# Patient Record
Sex: FEMALE | Race: WHITE | NOT HISPANIC OR LATINO | Employment: FULL TIME | URBAN - METROPOLITAN AREA
[De-identification: names, ages, dates, MRNs, and addresses within clinical notes are randomized per-mention and may not be internally consistent; named-entity substitution may affect disease eponyms.]

---

## 2017-05-05 ENCOUNTER — OFFICE VISIT (OUTPATIENT)
Dept: PODIATRY | Facility: CLINIC | Age: 47
End: 2017-05-05
Payer: COMMERCIAL

## 2017-05-05 VITALS
DIASTOLIC BLOOD PRESSURE: 72 MMHG | HEIGHT: 66 IN | BODY MASS INDEX: 23.3 KG/M2 | SYSTOLIC BLOOD PRESSURE: 114 MMHG | WEIGHT: 145 LBS

## 2017-05-05 DIAGNOSIS — Q66.70 PES CAVUS, CONGENITAL: ICD-10-CM

## 2017-05-05 DIAGNOSIS — M72.2 PLANTAR FASCIITIS OF RIGHT FOOT: ICD-10-CM

## 2017-05-05 DIAGNOSIS — M79.671 RIGHT FOOT PAIN: Primary | ICD-10-CM

## 2017-05-05 PROCEDURE — 99203 OFFICE O/P NEW LOW 30 MIN: CPT | Performed by: PODIATRIST

## 2017-05-05 NOTE — PROGRESS NOTES
"PATIENT HISTORY:  Arielle Domínguez is a 47 year old female who presents to clinic for pain right heel. Has seen [facility] and [facility] who have said \"it was and was not\" plantar fasciitis. She has tried inserts, massage, and did have a cortisone injection. It has been going on for over a year. Denies injury. pain is 5/10. Is in morning or when he gets up from sitting. Would like to know what is causing pain and what can be done for it.     Review of Systems:  Patient denies fever, chills, rash, wound,  numbness, weakness, heart burn, blood in stool, chest pain with activity, calf pain when walking, shortness of breath with activity, chronic cough, easy bleeding/bruising, swelling of ankles, excessive thirst, fatigue, depression, anxiety.  Patient admits to limping, stiffness.     PAST MEDICAL HISTORY: History reviewed. No pertinent past medical history.     PAST SURGICAL HISTORY:   Past Surgical History:   Procedure Laterality Date     GYN SURGERY  2012    polyps        MEDICATIONS: No current outpatient prescriptions on file.     ALLERGIES:  No Known Allergies     SOCIAL HISTORY:   Social History     Social History     Marital status: Single     Spouse name: N/A     Number of children: N/A     Years of education: N/A     Occupational History     Not on file.     Social History Main Topics     Smoking status: Never Smoker     Smokeless tobacco: Never Used     Alcohol use Yes      Comment: 1-2/mo     Drug use: No     Sexual activity: No     Other Topics Concern     Parent/Sibling W/ Cabg, Mi Or Angioplasty Before 65f 55m? No     Social History Narrative        FAMILY HISTORY:   Family History   Problem Relation Age of Onset     Hypertension Mother         EXAM:Vitals: /72  Ht 5' 5.5\" (1.664 m)  Wt 145 lb (65.8 kg)  BMI 23.76 kg/m2    General appearance: Patient is alert and fully cooperative with history & exam.  No sign of distress is noted during the visit.     Psychiatric: Affect is pleasant & appropriate.  Patient " appears motivated to improve health.     Respiratory: Breathing is regular & unlabored while sitting.     HEENT: Hearing is intact to spoken word.  Speech is clear.  No gross evidence of visual impairment that would impact ambulation.     Dermatologic: Skin is intact to both lower extremities without significant lesions, rash or abrasion.  No paronychia or evidence of soft tissue infection is noted.     Vascular: DP & PT pulses are intact & regular bilaterally.  No significant edema or varicosities noted.  CFT and skin temperature is normal to both lower extremities.     Neurologic: Lower extremity sensation is intact to light touch.  No evidence of weakness or contracture in the lower extremities.  No evidence of neuropathy.     Musculoskeletal: Patient is ambulatory without assistive device or brace.  Increase arch height. Pain on palpation of right plantar heel.     Radiographs: from [facility]. Increase calcaneal inclination angle. Plantar heel spur    MRI: from [facility]: plantar fasciitis, moderate.      ASSESSMENT:    Right foot pain  Plantar fasciitis of right foot  Pes cavus, congenital     PLAN:  Reviewed patient's chart in [facility]. The potential causes and nature of plantar fasciitis were discussed with the patient.  We reviewed the natural history/prognosis of the condition and risks if left untreated.  These include chronic pain, other sites of pain due to gait changes, and potential plantar fascial rupture.      We discussed possible causes of the condition as it relates to the patients specific situation.      Conservative treatment options were reviewed:  appropriate shoes, avoidance of barefoot walking, inserts/orthoses, stretching, ice, massage, immobilization and NSAIDs.     We also reviewed the options of injection therapy and surgery.  However, it was made clear that surgery is only considered when conservative therapy fails.  The risks and benefits of injection therapy, and surgery were discussed.     After  thorough discussion and answering all questions, the patient elected to try PT, anti inflammatory gel orthotics, and will get some blood work. Family history of rheumatoid arthritis. Will call with results. .        Betsy Farfan DPM, Podiatry/Foot and Ankle Surgery

## 2017-05-05 NOTE — NURSING NOTE
"Chief Complaint   Patient presents with     Foot Pain     right foot problems x1year Pain on the heel of foot on the medial side stiffness on the top of foot.        Initial /72  Ht 5' 5.5\" (1.664 m)  Wt 145 lb (65.8 kg)  BMI 23.76 kg/m2 Estimated body mass index is 23.76 kg/(m^2) as calculated from the following:    Height as of this encounter: 5' 5.5\" (1.664 m).    Weight as of this encounter: 145 lb (65.8 kg).  Medication Reconciliation: complete   Earl Portillo MA      "

## 2017-05-05 NOTE — LETTER
"  5/5/2017       RE: Arielle Domínguez  [address] [place] [address]           Dear Colleague,    Thank you for referring your patient, Arielle Domínguez, to the [facility]. Please see a copy of my visit note below.    PATIENT HISTORY:  Arielle Domínguez is a 47 year old female who presents to clinic for pain right heel. Has seen [facility] and [facility] who have said \"it was and was not\" plantar fasciitis. She has tried inserts, massage, and did have a cortisone injection. It has been going on for over a year. Denies injury. pain is 5/10. Is in morning or when he gets up from sitting. Would like to know what is causing pain and what can be done for it.     Review of Systems:  Patient denies fever, chills, rash, wound,  numbness, weakness, heart burn, blood in stool, chest pain with activity, calf pain when walking, shortness of breath with activity, chronic cough, easy bleeding/bruising, swelling of ankles, excessive thirst, fatigue, depression, anxiety.  Patient admits to limping, stiffness.     PAST MEDICAL HISTORY: History reviewed. No pertinent past medical history.     PAST SURGICAL HISTORY:   Past Surgical History:   Procedure Laterality Date     GYN SURGERY  2012    polyps        MEDICATIONS: No current outpatient prescriptions on file.     ALLERGIES:  No Known Allergies     SOCIAL HISTORY:   Social History     Social History     Marital status: Single     Spouse name: N/A     Number of children: N/A     Years of education: N/A     Occupational History     Not on file.     Social History Main Topics     Smoking status: Never Smoker     Smokeless tobacco: Never Used     Alcohol use Yes      Comment: 1-2/mo     Drug use: No     Sexual activity: No     Other Topics Concern     Parent/Sibling W/ Cabg, Mi Or Angioplasty Before 65f 55m? No     Social History Narrative        FAMILY HISTORY:   Family History   Problem Relation Age of Onset     Hypertension Mother         EXAM:Vitals: /72  Ht 5' 5.5\" (1.664 " m)  Wt 145 lb (65.8 kg)  BMI 23.76 kg/m2    General appearance: Patient is alert and fully cooperative with history & exam.  No sign of distress is noted during the visit.     Psychiatric: Affect is pleasant & appropriate.  Patient appears motivated to improve health.     Respiratory: Breathing is regular & unlabored while sitting.     HEENT: Hearing is intact to spoken word.  Speech is clear.  No gross evidence of visual impairment that would impact ambulation.     Dermatologic: Skin is intact to both lower extremities without significant lesions, rash or abrasion.  No paronychia or evidence of soft tissue infection is noted.     Vascular: DP & PT pulses are intact & regular bilaterally.  No significant edema or varicosities noted.  CFT and skin temperature is normal to both lower extremities.     Neurologic: Lower extremity sensation is intact to light touch.  No evidence of weakness or contracture in the lower extremities.  No evidence of neuropathy.     Musculoskeletal: Patient is ambulatory without assistive device or brace.  Increase arch height. Pain on palpation of right plantar heel.     Radiographs: from [facility]. Increase calcaneal inclination angle. Plantar heel spur    MRI: from [facility]: plantar fasciitis, moderate.      ASSESSMENT:    Right foot pain  Plantar fasciitis of right foot  Pes cavus, congenital     PLAN:  Reviewed patient's chart in [facility]. The potential causes and nature of plantar fasciitis were discussed with the patient.  We reviewed the natural history/prognosis of the condition and risks if left untreated.  These include chronic pain, other sites of pain due to gait changes, and potential plantar fascial rupture.      We discussed possible causes of the condition as it relates to the patients specific situation.      Conservative treatment options were reviewed:  appropriate shoes, avoidance of barefoot walking, inserts/orthoses, stretching, ice, massage, immobilization and NSAIDs.     We also  reviewed the options of injection therapy and surgery.  However, it was made clear that surgery is only considered when conservative therapy fails.  The risks and benefits of injection therapy, and surgery were discussed.     After thorough discussion and answering all questions, the patient elected to try PT, anti inflammatory gel orthotics, and will get some blood work. Family history of rheumatoid arthritis. Will call with results. .        Betsy Farfan DPM, Podiatry/Foot and Ankle Surgery          Again, thank you for allowing me to participate in the care of your patient.        Sincerely,              Betsy Farfan DPM, Podiatry/Foot and Ankle Surgery

## 2017-05-05 NOTE — PATIENT INSTRUCTIONS
DR. KEARNS'S CLINIC SCHEDULE     [facility]  [address] [place] [address]  P: [phone]  F: [phone] [facility]  [address] [place] [address]  P: [phone]  F: [phone] [facility]  [address] [place] [address]  P: [phone]  F: [phone]   FRIDAY AM FRIDAY PM SURGERY   [facility]     [facility]  [address]  [place] [address]  P: [phone] [facility]  [address]  [place] [address]  P: [phone]  F: [phone] Surgery [phone]   Appointment [phone] General After Hours:  [phone] Patient [phone]             Body Mass Index (BMI)  Many things can cause foot and ankle problems. Foot structure, activity level, foot mechanics and injuries are common causes of pain.    One very important issue that often goes unmentioned, is body weight.  Extra weight can cause increased stress on muscles, ligaments, bones and tendons.  Sometimes just a few extra pounds is all it takes to put one over her/his threshold.   Without reducing that stress, it can be difficult to alleviate pain.      Some people are uncomfortable addressing this issue, but we feel it is important for you to think about it.  As Foot &  Ankle specialists, our job is addressing the lower extremity problem and possible causes.     Regarding extra body weight, we encourage patients to discuss diet and weight management plans with their primary care doctors.  It is this team approach that gives you the best opportunity for pain relief and getting you back on your feet.        PLANTAR FASCIITIS  Plantar fasciitis is often referred to as heel spurs or heel pain. Plantar fasciitis is a very common problem that affects people of all foot shapes, age, weight and activity level. Pain may be in the arch or on the weight-bearing  surface of the heel. The pain may come on without injury or identifiable cause. Pain is generally present when first getting out of bed in the morning or up from a seated break.     CAUSES  The plantar fascia is a dense fibrous band of tissue that stretches across the bottom surface of the foot. The fascia helps support the foot muscles and arch. Plantar fasciitis is thought to be caused by mechanical strain or overload. Frequent walking without shoes or wearing unsupportive shoes is thought to cause structural overload and ultimately inflammation of the plantar fascia. Some people have heel spurs that can be seen on x-ray. The heel spur is actually a minor component of plantar fascitis and is largely ignored.       SELF TREATMENT   The easiest solution is to stop walking around your home without shoes. Plantar fasciitis is largely a shoe problem. Shoes are either not being worn often enough or your current shoes are inadequate for your weight, foot structure or activity level. The majority of shoes on the market today are not sufficient to resist development of plantar fasciitis or to promote healing. Assume that your current shoes are inadequate and will need to be replaced. Even high quality shoes wear out with 6 months to one year of frequent use. Weight loss is another option. Losing ten pounds in the next two months may be enough to resolve the problem. Ice applied to the area of pain two to three times per day for ten minutes each session can be very helpful. This should continue until the problem resolves. Achilles tendon stretching is essential. Stretch multiple times daily to promote healing and to prevent recurrence in the future.     MEDICAL TREATMENT  Medical treatments often include custom arch supports, cortisone injections, physical therapy, splints to be worn in bed, prescription medications and surgery. The home treatments listed above will be necessary regardless of these advanced medical  treatments. Surgery is rarely needed but is very helpful in selected cases.     PROGNOSIS  Plantar fasciitis can last from one day to a lifetime. Some people get intermittent fascitis that is very short-lived. Others suffer daily for years. Excessive body weight, frequent bare foot walking, long hours on the feet, inadequate shoes, predisposing foot structures and excessive activity such as running are all potential issues that lead to chronic and/or recurring plantar fascitis. Having plantar fasciitis means that you are forever prone to this problem and will require modification of some of the above factors. Most people seek treatment within one to four months. Healing usually requires a similar one to four month time frame. Healing time is relative to the amount of effort spent treating the problem.   Plantar fasciitis is highly recurrent. Risk factors often continue, including return to bare foot walking, inadequate shoes, excessive body weight, excessive activities, etc. Your life style and foot structure may predispose you to recurrent plantar fasciitis. A daily prevention regimen can be very helpful. Ongoing use of shoe inserts, careful attention to appropriate shoes, daily Achilles stretching, etc. may prevent recurrence. Prompt attention at the earliest warning signs of heel pain can resolve the problem in as short as a few days.     EXERCISES  Stair Exercise: Step on the stairs with the ball of your foot and hold your position for at least 15 seconds, then slowly step down with the heels of your foot. You can do this daily and as often as you want.   Picking the Towel: Sit comfortably and then pick the towel up with your toes. You can use any object other than a towel as long as the material can be soft and you can pick it up with your toes.  Rolling the Bottle: Use a small ball or frozen water bottle and then roll it around with your foot.   Flex the Toes: Sit comfortably and then flex your toes by  pointing it towards the floor or towards your body. This will relax and flex your foot and exercise your plantar fascia, the calf, and the Achilles tendon. The inability of the foot to stretch often causes the bunching up of the plantar fascia area leading to the pain.  Calf/Achilles Stretching: Lay on you back and raise one foot, then point your toes towards the floor. See photo below:               Hold each stretch for 10 seconds. Stretch 10 times per set, three sets per day. Morning, afternoon and evening. If your heel pain is very severe in the morning, consider doing the first set of stretches before you get out of bed.      OVER THE COUNTER INSERT RECOMMENDATIONS  SuperFeet   Sofsole Fit Spenco   Power Step   Walk-Fit Arch Cradles     Most of these can be found at your local [employer], sporting [employer] stores, or online.  **A good high quality over the counter insert should cost around $40-$50      [facility] LOCATIONS  [address]  [phone]   [address]  [phone] [place]  [address] [place] [address]  [phone]   [place]  [address]  [phone]   [place]  [address]  [phone] [place] [address]  [phone]   [place]  [address]  [phone]   [place]  [address]  [phone] [place]  [address]  [phone]

## 2017-05-05 NOTE — MR AVS SNAPSHOT
After Visit Summary   2017    Arielle Domínguez    MRN: [id number]           Patient Information     Date Of Birth          1970        Visit Information        Provider Department      2017 4:15 PM Betsy Farfan DPM, Podiatry/Foot [facility]le [facility]        Today's Diagnoses     Right foot pain    -  1    Plantar fasciitis of right foot        Pes cavus, congenital          Care Instructions    DR. FARFAN'S CLINIC SCHEDULE     [facility]  [address] [place] [address]  P: [phone]  F: [phone] [facility]  [address] [place] [address]  P: [phone]  F: [phone] [facility]  [address] [place] [address]  P: [phone]  F: [phone]   FRIDAY AM FRIDAY PM SURGERY   [facility]     [facility] [place]  [address]  [place] [address]  P: [phone] [facility] [place]  [address]  [place] [address]  P: [phone]  F: [phone] Surgery [phone]   Appointment [phone] General After Hours:  [phone] Patient [phone]             Body Mass Index (BMI)  Many things can cause foot and ankle problems. Foot structure, activity level, foot mechanics and injuries are common causes of pain.    One very important issue that often goes unmentioned, is body weight.  Extra weight can cause increased stress on muscles, ligaments, bones and tendons.  Sometimes just a few extra pounds is all it takes to put one over her/his threshold.   Without reducing that stress, it can be difficult to alleviate pain.      Some people are uncomfortable addressing this issue, but we feel it is important for you to think about it.  As Foot &  Ankle specialists, our job is addressing the lower extremity problem and possible causes.     Regarding extra body weight, we encourage patients to  discuss diet and weight management plans with their primary care doctors.  It is this team approach that gives you the best opportunity for pain relief and getting you back on your feet.        PLANTAR FASCIITIS  Plantar fasciitis is often referred to as heel spurs or heel pain. Plantar fasciitis is a very common problem that affects people of all foot shapes, age, weight and activity level. Pain may be in the arch or on the weight-bearing surface of the heel. The pain may come on without injury or identifiable cause. Pain is generally present when first getting out of bed in the morning or up from a seated break.     CAUSES  The plantar fascia is a dense fibrous band of tissue that stretches across the bottom surface of the foot. The fascia helps support the foot muscles and arch. Plantar fasciitis is thought to be caused by mechanical strain or overload. Frequent walking without shoes or wearing unsupportive shoes is thought to cause structural overload and ultimately inflammation of the plantar fascia. Some people have heel spurs that can be seen on x-ray. The heel spur is actually a minor component of plantar fascitis and is largely ignored.       SELF TREATMENT   The easiest solution is to stop walking around your home without shoes. Plantar fasciitis is largely a shoe problem. Shoes are either not being worn often enough or your current shoes are inadequate for your weight, foot structure or activity level. The majority of shoes on the market today are not sufficient to resist development of plantar fasciitis or to promote healing. Assume that your current shoes are inadequate and will need to be replaced. Even high quality shoes wear out with 6 months to one year of frequent use. Weight loss is another option. Losing ten pounds in the next two months may be enough to resolve the problem. Ice applied to the area of pain two to three times per day for ten minutes each session can be very helpful. This should  continue until the problem resolves. Achilles tendon stretching is essential. Stretch multiple times daily to promote healing and to prevent recurrence in the future.     MEDICAL TREATMENT  Medical treatments often include custom arch supports, cortisone injections, physical therapy, splints to be worn in bed, prescription medications and surgery. The home treatments listed above will be necessary regardless of these advanced medical treatments. Surgery is rarely needed but is very helpful in selected cases.     PROGNOSIS  Plantar fasciitis can last from one day to a lifetime. Some people get intermittent fascitis that is very short-lived. Others suffer daily for years. Excessive body weight, frequent bare foot walking, long hours on the feet, inadequate shoes, predisposing foot structures and excessive activity such as running are all potential issues that lead to chronic and/or recurring plantar fascitis. Having plantar fasciitis means that you are forever prone to this problem and will require modification of some of the above factors. Most people seek treatment within one to four months. Healing usually requires a similar one to four month time frame. Healing time is relative to the amount of effort spent treating the problem.   Plantar fasciitis is highly recurrent. Risk factors often continue, including return to bare foot walking, inadequate shoes, excessive body weight, excessive activities, etc. Your life style and foot structure may predispose you to recurrent plantar fasciitis. A daily prevention regimen can be very helpful. Ongoing use of shoe inserts, careful attention to appropriate shoes, daily Achilles stretching, etc. may prevent recurrence. Prompt attention at the earliest warning signs of heel pain can resolve the problem in as short as a few days.     EXERCISES  Stair Exercise: Step on the stairs with the ball of your foot and hold your position for at least 15 seconds, then slowly step down with  the heels of your foot. You can do this daily and as often as you want.   Picking the Towel: Sit comfortably and then pick the towel up with your toes. You can use any object other than a towel as long as the material can be soft and you can pick it up with your toes.  Rolling the Bottle: Use a small ball or frozen water bottle and then roll it around with your foot.   Flex the Toes: Sit comfortably and then flex your toes by pointing it towards the floor or towards your body. This will relax and flex your foot and exercise your plantar fascia, the calf, and the Achilles tendon. The inability of the foot to stretch often causes the bunching up of the plantar fascia area leading to the pain.  Calf/Achilles Stretching: Lay on you back and raise one foot, then point your toes towards the floor. See photo below:               Hold each stretch for 10 seconds. Stretch 10 times per set, three sets per day. Morning, afternoon and evening. If your heel pain is very severe in the morning, consider doing the first set of stretches before you get out of bed.      OVER THE COUNTER INSERT RECOMMENDATIONS  SuperFeet   Sofsole Fit Spenco   Power Step   Walk-Fit Arch Cradles     Most of these can be found at your local [email or web address], sporting goods stores, or online.  **A good high quality over the counter insert should cost around $40-$50      [employer] [address]  [phone]   [address]  [phone] [place]  [address] [place] [address]  [phone]   [place]  [address] [place] [address] Street [address]  [phone]   [place]  [address]  [phone] [place]  [address] [place] [address] [place]  [phone]   [place]  [address]  [phone]   [place]  [address]  [phone] [place]  [address]  [phone]                 Follow-ups after your visit        Additional Services     [facility] PT, HAND, AND CHIROPRACTIC REFERRAL       **This order will  print in the [facility] Scheduling Office**    Physical Therapy, Hand Therapy and Chiropractic Care are available through:    *[facility]  *[facility]  *[facility]    Call one number to schedule at any of the above locations: [phone].    Your provider has referred you to: Physical Therapy at [facility] or [facility]    Indication/Reason for Referral: right heel pain    Onset of Illness: year    Therapy Orders: Evaluate and Treat  Special Programs: None  Special Request: Exercise: Conditioning, Home Exercise Program, Posture/Body Mechanics and Stretching/Flexibility  Modalities: As Indicated:  and Ultrasound    Dyan Zaidi      Additional Comments for the Therapist or Chiropractor:     Please be aware that coverage of these services is subject to the terms and limitations of your health insurance plan.  Call member services at your health plan with any benefit or coverage questions.      Please bring the following to your appointment:    *Your personal calendar for scheduling future appointments  *Comfortable clothing                  Future tests that were ordered for you today     Open Future Orders        Priority Expected Expires Ordered    Rheumatoid factor Routine  5/5/2018 5/5/2017    HLA-B27 Typing Routine  7/31/2017 5/5/2017            Who to contact     If you have questions or need follow up information about today's clinic visit or your schedule please contact [facility] directly at [phone].  Normal or non-critical lab and imaging results will be communicated to you by MyChart, letter or phone within 4 business days after the clinic has received the results. If you do not hear from us within 7 days, please contact the clinic through MyChart or phone. If you have a critical or abnormal lab result, we will notify you by phone as soon as possible.  Submit refill requests through [employer] or call your pharmacy and they will forward the refill  "request to us. Please allow 3 business days for your refill to be completed.          Additional Information About Your Visit        [employer] Information     [email or web address] lets you send messages to your doctor, view your test results, renew your prescriptions, schedule appointments and more. To sign up, go to [email or web address] . Click on \"Log in\" on the left side of the screen, which will take you to the Welcome page. Then click on \"Sign up Now\" on the right side of the page.     You will be asked to enter the access code listed below, as well as some personal information. Please follow the directions to create your username and password.     Your access code is: [email or web address]  Expires: 8/3/2017  4:51 PM     Your access code will  in 90 days. If you need help or a new code, please call your [facility] clinic or [phone].        Care EveryWhere ID     This is your Care EveryWhere ID. This could be used by other organizations to access your [facility] medical records  [id number]        Your Vitals Were     Height BMI (Body Mass Index)                5' 5.5\" (1.664 m) 23.76 kg/m2           Blood Pressure from Last 3 Encounters:   17 114/72   13 110/60    Weight from Last 3 Encounters:   17 145 lb (65.8 kg)   13 145 lb 1.6 oz (65.8 kg)              We Performed the Following     [facility] PT, HAND, AND CHIROPRACTIC REFERRAL          Today's Medication Changes          These changes are accurate as of: 17  4:51 PM.  If you have any questions, ask your nurse or doctor.               Start taking these medicines.        Dose/Directions    diclofenac 1 % Gel topical gel   Commonly known as:  VOLTAREN   Used for:  Right foot pain, Plantar fasciitis of right foot, Pes cavus, congenital   Started by:  Betsy Farfan DPM, Podiatry/Foot and Ankle Surgery        Apply 4 grams to knees or 2 grams to hands four times daily using enclosed dosing card.   Quantity:  100 g   Refills:  1            Where to " get your medicines      These medications were sent to [facility]pharmacy #[id number] - [place], [place] - [address]  [address], [facility] [address]     Phone:  [phone]     diclofenac 1 % Gel topical gel                Primary Care Provider    None Specified       No primary provider on file.        Thank you!     Thank you for choosing [facility]  for your care. Our goal is always to provide you with excellent care. Hearing back from our patients is one way we can continue to improve our services. Please take a few minutes to complete the written survey that you may receive in the mail after your visit with us. Thank you!             Your Updated Medication List - Protect others around you: Learn how to safely use, store and throw away your medicines at www.disposemymeds.org.          This list is accurate as of: 5/5/17  4:51 PM.  Always use your most recent med list.                   Brand Name Dispense Instructions for use    diclofenac 1 % Gel topical gel    VOLTAREN    100 g    Apply 4 grams to knees or 2 grams to hands four times daily using enclosed dosing card.

## 2017-05-12 ENCOUNTER — THERAPY VISIT (OUTPATIENT)
Dept: PHYSICAL THERAPY | Facility: CLINIC | Age: 47
End: 2017-05-12
Payer: COMMERCIAL

## 2017-05-12 DIAGNOSIS — M72.2 PLANTAR FASCIITIS OF RIGHT FOOT: Primary | ICD-10-CM

## 2017-05-12 PROCEDURE — 97161 PT EVAL LOW COMPLEX 20 MIN: CPT | Mod: GP | Performed by: PHYSICAL THERAPIST

## 2017-05-12 PROCEDURE — 97110 THERAPEUTIC EXERCISES: CPT | Mod: GP | Performed by: PHYSICAL THERAPIST

## 2017-05-12 NOTE — MR AVS SNAPSHOT
"              After Visit Summary   5/12/2017    Arielle Domínguez    MRN: [id number]           Patient Information     Date Of Birth          1970        Visit Information        Provider Department      5/12/2017 4:40 PM Cruzito Ma, PT [facility]        Today's Diagnoses     Plantar fasciitis of right foot    -  1       Follow-ups after your visit        Your next 10 appointments already scheduled     May 20, 2017 11:20 AM CDT   [facility] Extremity with Cruzito Ma PT   [facility] ([facility] [place]  )    [address]  [address]  [facility] [address]   [phone]            May 26, 2017  4:40 PM CDT   [facility] Extremity with Cruzito Ma, PT   [facility] ([facility] [place]  )    [address]  [facility] [address]   [phone]              Who to contact     If you have questions or need follow up information about today's clinic visit or your schedule please contact [facility] directly at [phone].  Normal or non-critical lab and imaging results will be communicated to you by [employer], letter or phone within 4 business days after the clinic has received the results. If you do not hear from us within 7 days, please contact the clinic through [employer] or phone. If you have a critical or abnormal lab result, we will notify you by phone as soon as possible.  Submit refill requests through [employer] or call your pharmacy and they will forward the refill request to us. Please allow 3 business days for your refill to be completed.          Additional Information About Your Visit        [employer] Information     Epiphyte lets you send messages to your doctor, view your test results, renew your prescriptions, schedule appointments and more. To sign up, go to [email or web address] . Click on \"Log in\" on the left side of the screen, which will take you to the Welcome page. Then click on \"Sign up Now\" on the right side of the page.     You will be asked to enter the " access code listed below, as well as some personal information. Please follow the directions to create your username and password.     Your access code is: [email or web address]  Expires: 8/3/2017  4:51 PM     Your access code will  in 90 days. If you need help or a new code, please call your [facility] clinic or [phone].        Care EveryWhere ID     This is your Care EveryWhere ID. This could be used by other organizations to access your [facility] medical records  [id number]         Blood Pressure from Last 3 Encounters:   17 114/72   13 110/60    Weight from Last 3 Encounters:   17 65.8 kg (145 lb)   13 65.8 kg (145 lb 1.6 oz)              We Performed the Following     [facility] Inital Eval Report     PT Eval, Low Complexity (00932)     Therapeutic Exercises        Primary Care Provider    None Specified       No primary provider on file.        Thank you!     Thank you for choosing [facility]  for your care. Our goal is always to provide you with excellent care. Hearing back from our patients is one way we can continue to improve our services. Please take a few minutes to complete the written survey that you may receive in the mail after your visit with us. Thank you!             Your Updated Medication List - Protect others around you: Learn how to safely use, store and throw away your medicines at www.disposemymeds.org.          This list is accurate as of: 17 11:59 PM.  Always use your most recent med list.                   Brand Name Dispense Instructions for use    diclofenac 1 % Gel topical gel    VOLTAREN    100 g    Apply 4 grams to knees or 2 grams to hands four times daily using enclosed dosing card.

## 2017-05-12 NOTE — PROGRESS NOTES
Subjective:    Patient is a 47 year old female presenting with rehab right ankle/foot hpi.   Arielle Domínguez is a 47 year old female with a right foot condition.  Condition occurred with:  Repetition/overuse.  Condition occurred: during recreation/sport.  This is a chronic condition  Onset of right heel pain 3/16 secondary to exercise. Pt has had constant pain since despite physical therapy and deep tissue massage. Pt referred by MD for physical therapy on 5-5-17.    Site of Pain: heel.  Radiates to:  Lower leg and ankle.  Pain is described as aching and is constant and reported as 7/10.  Associated symptoms:  Loss of motion/stiffness and loss of strength. Pain is worse in the A.M..  Symptoms are exacerbated by weight bearing, standing, activity, walking and running and relieved by rest.  Since onset symptoms are unchanged.  Special tests:  MRI (pt reports plantar fascitis).  Previous treatment includes physical therapy and chiropractic.  There was moderate improvement following previous treatment.  General health as reported by patient is excellent.  Pertinent medical history includes:  None.  Medical allergies: no.  Other surgeries include:  Other (polyps removed lining of the uterus).  Current medications:  None as reported by patient.  Current occupation is teacher.  Patient is working in normal job without restrictions.  Primary job tasks include:  Prolonged standing.        Red flags:  None as reported by patient.                        Objective:          Flexibility/Screens:       Lower Extremity:      Decreased right lower extremity flexibility:  Gastroc and Soleus          Ankle/Foot Evaluation  ROM:    AROM:    Dorsiflexion: Left:    Right:   5  Plantarflexion: Left:     Right:  60          PROM:    Dorsiflexion: Left:        Right:   8   Plantarflexion: Left:        Right:  62              Strength:    Dorsiflexion:  Right: 4/5   Pain:  Plantarflexion: Right: 5/5  Pain:  Inversion:Right: 5/5   Pain:  Eversion:Right: 4+/5  Pain:              Strength wnl ankle: weak pelvic stabilizers.  LIGAMENT TESTING: normal                PALPATION: Palpation of ankle: point tenderness right heel.        FUNCTIONAL TESTS: Functional test ankle: fair balance.                                                              General     ROS    Assessment/Plan:      Patient is a 47 year old female with right side ankle complaints.    Patient has the following significant findings with corresponding treatment plan.                Diagnosis 1:  Right heel pain  Pain -  hot/cold therapy, US, manual therapy, self management, education and home program  Decreased ROM/flexibility - manual therapy, therapeutic exercise, therapeutic activity and home program  Decreased strength - therapeutic exercise, therapeutic activities and home program    Therapy Evaluation Codes:   1) History comprised of:   Personal factors that impact the plan of care:      Time since onset of symptoms.    Comorbidity factors that impact the plan of care are:      None.     Medications impacting care: None.  2) Examination of Body Systems comprised of:   Body structures and functions that impact the plan of care:      Ankle, Hip and Knee.   Activity limitations that impact the plan of care are:      Running, Sports, Stairs, Standing and Walking.  3) Clinical presentation characteristics are:   Stable/Uncomplicated.  4) Decision-Making    Low complexity using standardized patient assessment instrument and/or measureable assessment of functional outcome.  Cumulative Therapy Evaluation is: Low complexity.    Previous and current functional limitations:  (See Goal Flow Sheet for this information)    Short term and Long term goals: (See Goal Flow Sheet for this information)     Communication ability:  Patient appears to be able to clearly communicate and understand verbal and written communication and follow directions correctly.  Treatment Explanation - The  following has been discussed with the patient:   RX ordered/plan of care  Anticipated outcomes  Possible risks and side effects  This patient would benefit from PT intervention to resume normal activities.   Rehab potential is good.    Frequency:  1 X week, once daily  Duration:  for 6 weeks  Discharge Plan:  Achieve all LTG.  Independent in home treatment program.  Reach maximal therapeutic benefit.    Please refer to the daily flowsheet for treatment today, total treatment time and time spent performing 1:1 timed codes.

## 2017-05-13 ENCOUNTER — RESULTS ONLY (OUTPATIENT)
Dept: OTHER | Facility: CLINIC | Age: 47
End: 2017-05-13

## 2017-05-13 DIAGNOSIS — M79.671 RIGHT FOOT PAIN: ICD-10-CM

## 2017-05-13 DIAGNOSIS — M72.2 PLANTAR FASCIITIS OF RIGHT FOOT: ICD-10-CM

## 2017-05-13 DIAGNOSIS — Q66.70 PES CAVUS, CONGENITAL: ICD-10-CM

## 2017-05-13 PROCEDURE — 81374 HLA I TYPING 1 ANTIGEN LR: CPT | Performed by: PODIATRIST

## 2017-05-13 PROCEDURE — 86431 RHEUMATOID FACTOR QUANT: CPT | Performed by: PODIATRIST

## 2017-05-13 PROCEDURE — 36415 COLL VENOUS BLD VENIPUNCTURE: CPT | Performed by: PODIATRIST

## 2017-05-15 LAB
HLA-B27 QL NAA+PROBE: NORMAL
RHEUMATOID FACT SER NEPH-ACNC: <20 IU/ML (ref 0–20)

## 2017-05-17 LAB
B LOCUS: NORMAL
B27TEST METHOD: NORMAL

## 2017-05-20 ENCOUNTER — THERAPY VISIT (OUTPATIENT)
Dept: PHYSICAL THERAPY | Facility: CLINIC | Age: 47
End: 2017-05-20
Payer: COMMERCIAL

## 2017-05-20 DIAGNOSIS — M72.2 PLANTAR FASCIITIS OF RIGHT FOOT: ICD-10-CM

## 2017-05-20 PROCEDURE — 97530 THERAPEUTIC ACTIVITIES: CPT | Mod: GP | Performed by: PHYSICAL THERAPIST

## 2017-05-20 PROCEDURE — 97110 THERAPEUTIC EXERCISES: CPT | Mod: GP | Performed by: PHYSICAL THERAPIST

## 2017-05-20 NOTE — PROGRESS NOTES
Subjective:    HPI                    Objective:    System    Physical Exam    General     ROS    Assessment/Plan:      SUBJECTIVE  Subjective changes as noted by pt:  Pt saw a Dr Valery Steel last week who reviewed her MRI and mention the fraying of the peroneus brevis tendon. Pt was taped to correct inversion which helped decrease pain     Current pain level: 4/10     Changes in function:  Pt continues to participate in her Cross Fit class     Adverse reaction to treatment or activity:  None    OBJECTIVE  Changes in objective findings:  Progressed with strengthening to the right ankle and proprioception exercises. Weakness right ankle invertors and evertors      ASSESSMENT  Arielle continues to require intervention to meet STG and LTG's: PT  Patient is progressing as expected.  Response to therapy has shown an improvement in  function  Progress made towards STG/LTG?  Yes,     PLAN  Current treatment program is being advanced to more complex exercises.    PTA/ATC plan:  N/A    Please refer to the daily flowsheet for treatment today, total treatment time and time spent performing 1:1 timed codes.

## 2017-05-20 NOTE — MR AVS SNAPSHOT
After Visit Summary   5/20/2017    Arielle Domínguez    MRN: [id number]           Patient Information     Date Of Birth          1970        Visit Information        Provider Department      5/20/2017 11:20 AM Cruzito Ma, PT [facility]        Today's Diagnoses     Plantar fasciitis of right foot           Follow-ups after your visit        Your next 10 appointments already scheduled     May 26, 2017  4:40 PM CDT   [facility] Extremity with Cruzito Ma, PT   [facility] ([facility] [place]  )    [address] [facility]  [address] [place] [address]   [phone]            Jun 07, 2017 10:50 AM CDT   [facility] Extremity with Cruzito Ma, PT   [facility] ([facility] [place]  )    [address] [address] [address]   [phone]            Jun 13, 2017  9:30 AM CDT   [facility] Extremity with Cruzito Ma, PT   [facility] ([facility]  )    [id number] [place] [address]   [phone]            Jun 20, 2017  4:00 PM CDT   DA[facility] Extremity with Cruzito Ma, PT   [facility] ([facility] [place]  )    [address] [address] [address]   [phone]              Who to contact     If you have questions or need follow up information about today's clinic visit or your schedule please contact [facility] directly at [phone].  Normal or non-critical lab and imaging results will be communicated to you by MyChart, letter or phone within 4 business days after the clinic has received the results. If you do not hear from us within 7 days, please contact the clinic through MyChart or phone. If you have a critical or abnormal lab result, we will notify you by phone as soon as possible.  Submit refill requests through [employer] or call your pharmacy and they will forward the refill request to us. Please allow 3 business days for your refill to be completed.          Additional Information About Your Visit        [employer]  "Information     [employer] lets you send messages to your doctor, view your test results, renew your prescriptions, schedule appointments and more. To sign up, go to [email or web address] . Click on \"Log in\" on the left side of the screen, which will take you to the Welcome page. Then click on \"Sign up Now\" on the right side of the page.     You will be asked to enter the access code listed below, as well as some personal information. Please follow the directions to create your username and password.     Your access code is: [email or web address]  Expires: 8/3/2017  4:51 PM     Your access code will  in 90 days. If you need help or a new code, please call your [facility] clinic or [phone].        Care EveryWhere ID     This is your Care EveryWhere ID. This could be used by other organizations to access your [facility] medical records  [id number]         Blood Pressure from Last 3 Encounters:   17 114/72   13 110/60    Weight from Last 3 Encounters:   17 65.8 kg (145 lb)   13 65.8 kg (145 lb 1.6 oz)              We Performed the Following     Therapeutic Activities     Therapeutic Exercises        Primary Care Provider    None Specified       No primary provider on file.        Thank you!     Thank you for choosing [facility]  for your care. Our goal is always to provide you with excellent care. Hearing back from our patients is one way we can continue to improve our services. Please take a few minutes to complete the written survey that you may receive in the mail after your visit with us. Thank you!             Your Updated Medication List - Protect others around you: Learn how to safely use, store and throw away your medicines at www.disposemymeds.org.          This list is accurate as of: 17 12:07 PM.  Always use your most recent med list.                   Brand Name Dispense Instructions for use    diclofenac 1 % Gel topical gel    VOLTAREN    100 g    Apply 4 grams to " knees or 2 grams to hands four times daily using enclosed dosing card.

## 2017-05-26 ENCOUNTER — THERAPY VISIT (OUTPATIENT)
Dept: PHYSICAL THERAPY | Facility: CLINIC | Age: 47
End: 2017-05-26
Payer: COMMERCIAL

## 2017-05-26 DIAGNOSIS — M72.2 PLANTAR FASCIITIS OF RIGHT FOOT: ICD-10-CM

## 2017-05-26 PROCEDURE — 97530 THERAPEUTIC ACTIVITIES: CPT | Mod: GP | Performed by: PHYSICAL THERAPIST

## 2017-05-26 PROCEDURE — 97110 THERAPEUTIC EXERCISES: CPT | Mod: GP | Performed by: PHYSICAL THERAPIST

## 2017-06-07 ENCOUNTER — THERAPY VISIT (OUTPATIENT)
Dept: PHYSICAL THERAPY | Facility: CLINIC | Age: 47
End: 2017-06-07
Payer: COMMERCIAL

## 2017-06-07 DIAGNOSIS — M72.2 PLANTAR FASCIITIS OF RIGHT FOOT: ICD-10-CM

## 2017-06-07 PROCEDURE — 97110 THERAPEUTIC EXERCISES: CPT | Mod: GP | Performed by: PHYSICAL THERAPIST

## 2017-06-07 PROCEDURE — 97530 THERAPEUTIC ACTIVITIES: CPT | Mod: GP | Performed by: PHYSICAL THERAPIST

## 2017-06-07 NOTE — PROGRESS NOTES
Subjective:    HPI                    Objective:    System    Physical Exam    General     ROS    Assessment/Plan:      SUBJECTIVE  Subjective changes as noted by pt:  Pt notes increased strength and endurance     Current pain level: 4/10 Current Pain level: 4/10   Changes in function:  Pt notes increased ease with ambulation. Balance remains challenging     Adverse reaction to treatment or activity:  None    OBJECTIVE  Changes in objective findings:  Pt has difficulty maintaining arch with single leg balance. Improved strength pelvic stabilizers        ASSESSMENT  Arielle continues to require intervention to meet STG and LTG's: PT  Patient's symptoms are resolving.  Response to therapy has shown an improvement in  strength and function  Progress made towards STG/LTG?  Yes,     PLAN  Current treatment program is being advanced to more complex exercises.    PTA/ATC plan:  N/A    Please refer to the daily flowsheet for treatment today, total treatment time and time spent performing 1:1 timed codes.

## 2017-06-07 NOTE — MR AVS SNAPSHOT
"              After Visit Summary   6/7/2017    Arielle Domínguez    MRN: [id number]           Patient Information     Date Of Birth          1970        Visit Information        Provider Department      6/7/2017 10:50 AM Cruzito Ma, PT [facility]        Today's Diagnoses     Plantar fasciitis of right foot           Follow-ups after your visit        Your next 10 appointments already scheduled     Jun 13, 2017  9:30 AM CDT   [facility] Extremity with Cruzito Ma, PT   [facility] ([facility] [place]  )    [address] [address] [address]   [phone]            Jun 20, 2017  4:00 PM CDT   [facility]remity with Cruzito Ma, PT   [facility]    [address] [address] [address]   [phone]              Who to contact     If you have questions or need follow up information about today's clinic visit or your schedule please contact [facility] directly at [phone].  Normal or non-critical lab and imaging results will be communicated to you by [employer], letter or phone within 4 business days after the clinic has received the results. If you do not hear from us within 7 days, please contact the clinic through [employer] or phone. If you have a critical or abnormal lab result, we will notify you by phone as soon as possible.  Submit refill requests through [employer] or call your pharmacy and they will forward the refill request to us. Please allow 3 business days for your refill to be completed.          Additional Information About Your Visit        [employer] Information     [employer] lets you send messages to your doctor, view your test results, renew your prescriptions, schedule appointments and more. To sign up, go to [email or web address] . Click on \"Log in\" on the left side of the screen, which will take you to the Welcome page. Then click on \"Sign up Now\" on the right side of the page.     You will be asked to enter the access " code listed below, as well as some personal information. Please follow the directions to create your username and password.     Your access code is: [email or web address]  Expires: 8/3/2017  4:51 PM     Your access code will  in 90 days. If you need help or a new code, please call your [facility] clinic or [phone].        Care EveryWhere ID     This is your Care EveryWhere ID. This could be used by other organizations to access your [facility] medical records  [id number]         Blood Pressure from Last 3 Encounters:   17 114/72   13 110/60    Weight from Last 3 Encounters:   17 65.8 kg (145 lb)   13 65.8 kg (145 lb 1.6 oz)              We Performed the Following     Therapeutic Activities     Therapeutic Exercises        Primary Care Provider    None Specified       No primary provider on file.        Thank you!     Thank you for choosing [facility]  for your care. Our goal is always to provide you with excellent care. Hearing back from our patients is one way we can continue to improve our services. Please take a few minutes to complete the written survey that you may receive in the mail after your visit with us. Thank you!             Your Updated Medication List - Protect others around you: Learn how to safely use, store and throw away your medicines at www.disposemymeds.org.          This list is accurate as of: 17 12:18 PM.  Always use your most recent med list.                   Brand Name Dispense Instructions for use    diclofenac 1 % Gel topical gel    VOLTAREN    100 g    Apply 4 grams to knees or 2 grams to hands four times daily using enclosed dosing card.

## 2017-06-13 ENCOUNTER — THERAPY VISIT (OUTPATIENT)
Dept: PHYSICAL THERAPY | Facility: CLINIC | Age: 47
End: 2017-06-13
Payer: COMMERCIAL

## 2017-06-13 DIAGNOSIS — M72.2 PLANTAR FASCIITIS OF RIGHT FOOT: Primary | ICD-10-CM

## 2017-06-13 PROCEDURE — 97530 THERAPEUTIC ACTIVITIES: CPT | Mod: GP | Performed by: PHYSICAL THERAPIST

## 2017-06-13 PROCEDURE — 97110 THERAPEUTIC EXERCISES: CPT | Mod: GP | Performed by: PHYSICAL THERAPIST

## 2017-06-13 NOTE — MR AVS SNAPSHOT
"              After Visit Summary   6/13/2017    Arielle Domínguez    MRN: [id number]           Patient Information     Date Of Birth          1970        Visit Information        Provider Department      6/13/2017 9:30 AM Cruzito Ma, PT [facility]        Today's Diagnoses     Plantar fasciitis of right foot    -  1       Follow-ups after your visit        Your next 10 appointments already scheduled     Jun 20, 2017  4:00 PM CDT   [facility] Extremity with [facility] ([facility]  )    [address] [address] [address]   [phone]              Who to contact     If you have questions or need follow up information about today's clinic visit or your schedule please contact [facility] directly at [phone].  Normal or non-critical lab and imaging results will be communicated to you by MyChart, letter or phone within 4 business days after the clinic has received the results. If you do not hear from us within 7 days, please contact the clinic through MyChart or phone. If you have a critical or abnormal lab result, we will notify you by phone as soon as possible.  Submit refill requests through [employer] or call your pharmacy and they will forward the refill request to us. Please allow 3 business days for your refill to be completed.          Additional Information About Your Visit        MyChart Information     [employer] lets you send messages to your doctor, view your test results, renew your prescriptions, schedule appointments and more. To sign up, go to [email or web address] . Click on \"Log in\" on the left side of the screen, which will take you to the Welcome page. Then click on \"Sign up Now\" on the right side of the page.     You will be asked to enter the access code listed below, as well as some personal information. Please follow the directions to create your username and password.     Your access code is: [email or web address]  Expires: 8/3/2017  " 4:51 PM     Your access code will  in 90 days. If you need help or a new code, please call your [facility] clinic or [phone].        Care EveryWhere ID     This is your Care EveryWhere ID. This could be used by other organizations to access your [facility] medical records  [id number]         Blood Pressure from Last 3 Encounters:   17 114/72   13 110/60    Weight from Last 3 Encounters:   17 65.8 kg (145 lb)   13 65.8 kg (145 lb 1.6 oz)              We Performed the Following     Therapeutic Activities     Therapeutic Exercises        Primary Care Provider    None Specified       No primary provider on file.        Thank you!     Thank you for choosing [facility]  for your care. Our goal is always to provide you with excellent care. Hearing back from our patients is one way we can continue to improve our services. Please take a few minutes to complete the written survey that you may receive in the mail after your visit with us. Thank you!             Your Updated Medication List - Protect others around you: Learn how to safely use, store and throw away your medicines at www.disposemymeds.org.          This list is accurate as of: 17 10:39 AM.  Always use your most recent med list.                   Brand Name Dispense Instructions for use    diclofenac 1 % Gel topical gel    VOLTAREN    100 g    Apply 4 grams to knees or 2 grams to hands four times daily using enclosed dosing card.

## 2017-09-27 PROBLEM — M72.2 PLANTAR FASCIITIS OF RIGHT FOOT: Status: RESOLVED | Noted: 2017-05-13 | Resolved: 2017-09-27

## 2017-09-27 NOTE — PROGRESS NOTES
Subjective:SUBJECTIVE  Subjective changes as noted by pt:  Pt notes increased strength and endurance     Current pain level: 4/10 Current Pain level: 4/10   Changes in function:  Pt notes increased ease with ambulation. Balance remains challenging     Adverse reaction to treatment or activity:  None     OBJECTIVE  Changes in objective findings:  Pt has difficulty maintaining arch with single leg balance. Improved strength pelvic stabilizers    HPI                    Objective:    System    Physical Exam    General     ROS    Assessment/Plan:      ASSESSMENT/PLAN  Updated problem list and treatment plan: Diagnosis 1:  Right heel pain  Pain -  hot/cold therapy, manual therapy, self management, education and home program  Decreased ROM/flexibility - manual therapy, therapeutic exercise, therapeutic activity and home program  Decreased strength - therapeutic exercise, therapeutic activities and home program  Progress toward STG/LTGs have been made:  Yes,   Assessment of Progress: The patient's condition is improving.  Self Management Plans:  Patient has been instructed in a home treatment program.  I have re-evaluated this patient and find that the nature, scope, duration and intensity of the therapy is appropriate for the medical condition of the patient.  Arielle continues to require the following intervention to meet STG and LT's:  PT    Recommendations:  Pt has not returned for treatment since 6-13-17. Pt discharged at this time.     Please refer to the daily flowsheet for treatment today, total treatment time and time spent performing 1:1 timed codes.

## 2023-05-10 ENCOUNTER — OFFICE VISIT (OUTPATIENT)
Dept: VASCULAR SURGERY | Facility: CLINIC | Age: 53
End: 2023-05-10
Payer: COMMERCIAL

## 2023-05-10 DIAGNOSIS — I83.93 SPIDER VEINS OF BOTH LOWER EXTREMITIES: Primary | ICD-10-CM

## 2023-05-10 PROCEDURE — 99203 OFFICE O/P NEW LOW 30 MIN: CPT | Performed by: SURGERY

## 2023-05-10 NOTE — PROGRESS NOTES
VEINSOLUTIONS CONSULTATION    HPI:    Arielle Domínguez is a pleasant 53 year old female referred by Dr. Emma Young for evaluation of bilateral lower extremity telangiectasias and reticular veins primarily of cosmetic concern.  Arielle does report some mild discomfort on the lateral aspect of her thigh but this is not significant.  She has only noticed telangiectasias over her thighs recently and feels that may have recently developed.  She has not suffered trauma to the areas.  She has not had swelling, deep vein thrombosis, superficial thrombophlebitis or hemorrhage.    She exercises regularly, vigorously, lifting weights.  She has not worn compression hose to any significant extent.    Her family history is negative for varicose veins or venous thromboembolism.    PAST MEDICAL HISTORY: No past medical history on file.    PAST SURGICAL HISTORY:   Past Surgical History:   Procedure Laterality Date     GYN SURGERY  2012    polyps       FAMILY HISTORY:   Family History   Problem Relation Age of Onset     Hypertension Mother        SOCIAL HISTORY:   Social History     Tobacco Use     Smoking status: Never     Smokeless tobacco: Never   Vaping Use     Vaping status: Not on file   Substance Use Topics     Alcohol use: Yes     Comment: 1-2/mo       REVIEW OF SYSTEMS: Review Of Systems  Skin: negative  Eyes: negative  Ears/Nose/Throat: negative  Respiratory: No shortness of breath, dyspnea on exertion, cough, or hemoptysis  Cardiovascular: negative  Gastrointestinal: negative  Genitourinary: negative  Musculoskeletal: negative  Neurologic: negative  Psychiatric: negative  Hematologic/Lymphatic/Immunologic: negative  Endocrine: negative      Vital signs:  There were no vitals taken for this visit.    No current outpatient medications on file.       PHYSICAL EXAM:  General: Pleasant, NAD.   HEENT: Normocephalic, atraumatic, external ears and nose normal.   Respiratory: Normal respiratory effort.   Cardiovascular: Pulse is  regular.   Musculoskeletal: Gait and station normal.  The joints of her fingers and toes without deformity.  There is no cyanosis of her nailbeds.   EXTREMITIES: Right lower extremity: A few scattered telangiectasias over the right lateral thigh and a few reticular veins in the right popliteal crease.  No significant varicose veins, stasis changes or edema.    Left lower extremity: Reticular veins and scattered telangiectasias over the mid lateral left thigh and a few in the left popliteal fossa.  No bulging varicose veins, stasis changes or edema.  PULSES: R/L (3=normal pulse, 0=no palpable pulse) dorsalis pedis: 3/3; posterior tibial: 3/3.      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  These are primarily of cosmetic concern though she has some mild discomfort in the lateral left thigh.  I see nothing that concerns me for significant, underlying valve incompetence, therefore I would not recommend any venous imaging.    We discussed the mostly vein anatomy, the pathophysiology of telangiectasias and reticular veins and the option of continued conservative management with  minimal risks.    The only reason to treat these veins would be for cosmetic purposes.  Details of cosmetic sclerotherapy including risks of allergic reaction, deep antibiosis, ulceration and hyperpigmentation as well as the need for multiple sessions was discussed.  She understands that my goal is a 75-80% reduction in the appearance of the veins.    PLAN:  Possible bilateral lower extremity cosmetic sclerotherapy.  Estimates given.     Olaf Huffman MD    Dictated using Dragon voice recognition software which may result in transcription errors        VEIN CLINIC LEG DRAWING:

## 2023-05-10 NOTE — PROGRESS NOTES
After Visit Follow Up  Per Dr. Huffman, patient was recommended to have 2 - 4 sessions at $407 of cosmetic sclerotherapy.    Reviewed with and gave to patient our vein clinic sclerotherapy packet of information which includes basic sclerotherapy information, pre-treatment instructions and post-treatment instructions.    Patient in agreement with plan and had no further questions.    VASYL Kam on 5/10/2023 at 9:49 AM

## 2023-05-10 NOTE — PATIENT INSTRUCTIONS
Sclerotherapy: Pre-Treatment Instructions    Recommended Sessions:  2-4 treatment sessions    Pricing: Full session - $407                 *Payment is due at the time of visit following the treatment    Time Required per Treatment Session - About 45 minutes  Please come in 15 minutes before your scheduled appointment.  30 min.  Sclerotherapy treatments last approximately 30 minutes.  5 min.    A staff member will wipe your legs off with warm water and dry them with a wash cloth.                 Then you can put your compression hose on, get dressed and check out.  10 min.  After your treatment, you will be asked to walk around for 10 minutes before you get in your car.    Medications  Five days before your appointment, discontinue aspirin (Bufferin, Anacin, etc.) and Ibuprofen (Motrin, Advil, Aleve, etc.) to reduce bruising. Resume these medications the day following the treatment.    Leg Preparation  Do not shave your legs or apply any oil, lotion or powder the night before or the day of your treatment.    Clothing  Shorts:  Bring a pair of loose, comfortable shorts to wear during your treatment (or you can choose to wear ours). Shoes: Bring comfortable shoes to accommodate the compression hose after your treatment. Do not wear flip-flops or thong-style sandals unless you have open-toe compression hose.    Photographs  Photos will be taken before each treatment. This helps monitor your progress.    Injections  The physician will inject your veins with the sclerotherapy solution chosen to meet your specific needs.    Compression Hose  Please bring your compression hose if you have them. They may also be reserved for you at our clinic. Compression hose must be worn immediately after each sclerotherapy treatment.  The hose must be compression level 20-30, and they must be worn for 24 hours straight after your treatment.  If you have never worn compression hose before, a staff member will teach you how to put them  on.             You cannot have a treatment without compression hose.               They are critical to the success of your treatment!    You may purchase your compression hose from us. We will measure you and have the hose available when you come for your treatment.    Cancellation and Rescheduling  If you need to cancel or reschedule your sclerotherapy treatment, please give our office at least 24 hour notice.    Sclerotherapy: Basic Information        What is sclerotherapy?  Sclerotherapy is a treatment for  spider  veins.  Spider  veins are small veins just under your skin that can look red, blue or purple. Most  spider  veins are only a cosmetic problem.  Spider  veins are not useful and treating them will not affect your circulation.    How does sclerotherapy work?  1.  Injections: A very small needle is used to inject a solution into the  spider  veins. The solution irritates the cells that line the vein walls. This causes the veins to collapse. The vein walls to stick together and they can no longer carry blood. Different solutions are used based on the size of the veins.  2.  Compression:  The spider veins are kept collapsed by wearing compression stockings. Your body will break down and absorb the treated veins. You wear the compression hose for 24 hours after the treatment and then for 4 more days during your waking hours only.    How does the body heal after sclerotherapy?  The process is similar to how your body heals after a bad bruise. It takes 4-6 weeks or more for the healing to be complete. When the healing is complete, the vein is no longer visible. It may take more than one treatment.    How do I get the best results?  It is important to follow the post-sclerotherapy instructions. The best results require time and patience. The injection sites will continue to heal and fade for months after a treatment. Please discuss your expectations with your doctor to keep them realistic. Your doctor will do  everything possible to meet or exceed your expectations.    How many treatments are needed?  After your initial exam, your doctor will give you an estimate of the number of treatments that may be required. It depends upon the size, type, and quantity of your  spider  veins and on the doctor's assessment, your history and expectations. You may end up needing fewer or more treatments.    How soon can I have another treatment?  Additional treatments are scheduled every 4-6 weeks to allow time for the body to respond to the previous treatment.    Common Side Effects:  Itching  The areas that were injected may itch. This is usually mild and lasts less than a day. Do not use lotions or creams on your legs until the injection sites have healed over.    Pain  It is common to have some tenderness at the injection sites. Injection of the solution can be uncomfortable, but is usually well tolerated by most patients. The tenderness is temporary, lasting 24 hours at most. Tylenol or Ibuprofen can be used, if needed, following the product directions.    Bruising  This may occur at the injections sites. Bruising may be minimized by avoiding Aspirin and Ibuprofen products for five days before each treatment session.    Hyperpigmentation  A light brown discoloration of the skin may develop along the veins in the areas injected. Approximately 20-30% of patients treated note the discoloration (which is lighter and less obvious than the veins that are being treated). The hyperpigmentation usually fades in a couple of weeks, but may take several months to a year to totally resolve. There is a 1% chance of hyperpigmentation continuing after one year.    Trapped blood  A small amount of blood may become trapped and hardened in the veins. This may feel like a knot or cord and it may look dark blue or bruised. This is a common occurrence. You may need to return before your next treatment so this area can be drained to remove the trapped  blood. This will reduce the hyperpigmentation that can occur. The chance of this occurring can be decreased with proper use of compression hose after your treatment.    Matting  Matting is the formation of new, fine  spider  veins in the area injected.  It occurs in approximately 10% of patients injected. The exact reason for this is unknown. If untreated, the matting usually resolves in 3 to 12 months, but very rarely, it can be permanent. If the matting does not fade, it can be re-injected.    Rare Side Effects:  Ulceration at injection sites  Very rarely, a small ulcer will occur at the site where a vein is injected. An ulcer can take 4 to 6 weeks to completely heal. A small scar may result.    Allergic reactions  There is a very rare incidence of an allergic reaction to the solution injected. You will be observed for such reaction and will be treated appropriately should it occur. Please inform us of any allergy history.    Pulmonary embolus/Deep vein thrombosis  This is a blood clot which moves to the lungs/a blood clot in the deep vein system. There is an extraordinarily low incidence of this complication.      SCLEROTHERAPY AFTER CARE  Immediately:  After treatment, walk for 10-15 minutes before getting in your car.  If your trip home is more than 1 hour, stop and walk around for 5-10 minutes. Avoid sitting or standing for extended periods.   First 24 hours: Wear your compression continuously, even while in bed. After the 24 hours, you may shower if you want to. Put your hose back on, unless you are going to bed. You should NOT wear compression to bed after the first 24 hours. You may fly the next day, but wear your compression.   For 5 days: Wear the compression hose for waking hours only. You may continue to wear them longer than 5 days if you prefer.   For days 5-7: Walking is encouraged, as it promotes efficient circulation in your veins. You may do activities that raise your heart rate, but do NOT run,  jog, do high impact aerobics, or weight lifting. After 7 days, no activity restrictions.  Shaving: Wait a few days to shave or apply lotion.   Bathing: Do NOT take hot baths or sit in a hot tub for 7-10 days.    For 1 year: Wear SPF 30 sunscreen on your legs when in the sun. This is very important! It helps prevent darkening of the skin at the injection sites.   Medications: You may resume your usual medications, including aspirin or ibuprofen.    Common Things to Expect       Compression must be worn for the first 24 hours and then during the day for 5-7 days.    If larger veins are treated with ultrasound-guided sclerotherapy, you will have redness, firmness, tenderness, and swelling.  This firmness and tenderness may take 3-6 months to resolve. Ibuprofen and compression hose will aid in this process.    You will have bruising that can last up to 3 weeks. Most fading of the veins will occur between 3 and 6 weeks after treatment.    You may notice brown discoloration (hyperpigmentation) at the treatment site.  This should fade with time, but will take 3 months to 1 year to fully heal.     Some treated veins may look darker because of trapped blood within the vein. This trapped blood can be removed at a minimum of 1 month following treatment. Larger veins are more likely to develop trapped blood.    It is very important for you to use at least SPF 30 sunscreen in order to help prevent the discoloration of your skin.    Migraines rarely occur following sclerotherapy, but are more likely in patients with a history of migraines.  Treat as you would any other migraine.      [employer] last reviewed this educational content on 11/1/2019 2000-2021 [employer]. All rights reserved. This information is not intended as a substitute for professional medical care. Always follow your healthcare professional's instructions.

## 2023-05-10 NOTE — NURSING NOTE
Patient Reported symptoms:    Bilateral leg   Heaviness None of the time   Achiness A little of the time   Swelling None of the time   Throbbing None of the time   Itching None of the time   Appearance Slightly noticeable   Impact on work/activities Symptoms but full able to participate

## 2023-09-28 ENCOUNTER — TELEPHONE (OUTPATIENT)
Dept: VASCULAR SURGERY | Facility: CLINIC | Age: 53
End: 2023-09-28
Payer: COMMERCIAL

## 2023-09-28 NOTE — TELEPHONE ENCOUNTER
Called and spoke with patient. All questions answered during the call. Patient wanting to purchase hose from clinic day of appt. Called and spoke with PV in [place], hose will be pulled and on back shelf for patient to purchase. Patient verbalized understanding of all information. No further questions or concerns.       After Visit Follow Up  Per pt request,   Pt would like one pair(s) of beige, open-toe, thigh high, 20-30mmhg compression hose.     Patient aware they will be at clinic day of treatment.     Katerina Augustine RN

## 2023-09-28 NOTE — TELEPHONE ENCOUNTER
Pt is calling as 1. She would like to purchase hose day of appt and 2. Has some questions regarding the sclerotherapy.

## 2023-10-03 ENCOUNTER — OFFICE VISIT (OUTPATIENT)
Dept: VASCULAR SURGERY | Facility: CLINIC | Age: 53
End: 2023-10-03
Payer: COMMERCIAL

## 2023-10-03 ENCOUNTER — ALLIED HEALTH/NURSE VISIT (OUTPATIENT)
Dept: VASCULAR SURGERY | Facility: CLINIC | Age: 53
End: 2023-10-03
Payer: COMMERCIAL

## 2023-10-03 DIAGNOSIS — I83.93 SPIDER VEINS OF BOTH LOWER EXTREMITIES: Primary | ICD-10-CM

## 2023-10-03 DIAGNOSIS — I78.1 SPIDER TELANGIECTASIS OF SKIN: ICD-10-CM

## 2023-10-03 PROCEDURE — A6533 GC STOCKING THIGHLNGTH 18-30: HCPCS

## 2023-10-03 PROCEDURE — 36468 NJX SCLRSNT SPIDER VEINS: CPT | Performed by: SURGERY

## 2023-10-03 PROCEDURE — 99207 PR NO CHARGE NURSE ONLY: CPT

## 2023-10-03 PROCEDURE — S9999 SALES TAX: HCPCS | Performed by: SURGERY

## 2023-10-03 RX ORDER — ESTRADIOL 1 MG/1
1 TABLET ORAL DAILY
COMMUNITY

## 2023-10-03 RX ORDER — PROGESTERONE 100 MG/1
1 CAPSULE ORAL DAILY
COMMUNITY

## 2023-10-03 NOTE — LETTER
10/3/2023         RE: Arielle Domínguez  [address] [place] [address]        Dear Colleague,    Thank you for referring your patient, Arielle Domínguez, to the [facility]IN [facility] [place]. Please see a copy of my visit note below.        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  The patient voiced understanding and wished to proceed.  Informed consent was obtained.    I donned the Syris headlight and injected telangiectasias most numerous on the left lateral thigh greater than the right lateral thigh and injecting reticular veins in the popliteal fossas and posterior thighs bilaterally.  There is also a telangiectasia in the left medial ankle that I injected.  Using a total of 2 syringes of 0.5% polidocanol foam.  Had the patient perform ankle pumps.    We cleaned her legs, had her don compression, then had her walk for 10 minutes.  She will return in about 6 weeks in follow-up.  Sclerotherapy    Date/Time: 10/3/2023 12:24 PM    Performed by: Olaf Huffman MD  Authorized by: Olaf Huffman MD    Time out: Immediately prior to the procedure a time out was called    Type:  Cosmetic  Session:  Full  Procedure side:  Bilateral  Solution/Amount:  .5 POLIDOCANOL  Syringes:  2  Patient tolerance:  Patient tolerated the procedure well with no immediate complications  Wrap/Hose:  Shaun Huffman MD    Dictated using Dragon voice recognition software which may result in transcription errors      Again, thank you for allowing me to participate in the care of your patient.        Sincerely,        Olaf Huffman MD

## 2023-10-03 NOTE — NURSING NOTE
Patient purchased 1 pair(s) of BEIGE, THIGH high, OPEN-toe, size 2 compression hose from the clinic today.     Informed patient all compression hose purchases are final.    Jeannette Mills on 10/3/2023 at 10:02 AM

## 2023-10-03 NOTE — PROGRESS NOTES
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  The patient voiced understanding and wished to proceed.  Informed consent was obtained.    I donned the Syris headlight and injected telangiectasias most numerous on the left lateral thigh greater than the right lateral thigh and injecting reticular veins in the popliteal fossas and posterior thighs bilaterally.  There is also a telangiectasia in the left medial ankle that I injected.  Using a total of 2 syringes of 0.5% polidocanol foam.  Had the patient perform ankle pumps.    We cleaned her legs, had her don compression, then had her walk for 10 minutes.  She will return in about 6 weeks in follow-up.  Sclerotherapy    Date/Time: 10/3/2023 12:24 PM    Performed by: Olaf Huffman MD  Authorized by: Olaf Huffman MD    Time out: Immediately prior to the procedure a time out was called    Type:  Cosmetic  Session:  Full  Procedure side:  Bilateral  Solution/Amount:  .5 POLIDOCANOL  Syringes:  2  Patient tolerance:  Patient tolerated the procedure well with no immediate complications  Wrap/Hose:  Shaun Huffman MD    Dictated using Dragon voice recognition software which may result in transcription errors

## 2023-12-04 ENCOUNTER — OFFICE VISIT (OUTPATIENT)
Dept: VASCULAR SURGERY | Facility: CLINIC | Age: 53
End: 2023-12-04
Payer: COMMERCIAL

## 2023-12-04 DIAGNOSIS — I78.1 SPIDER TELANGIECTASIS OF SKIN: Primary | ICD-10-CM

## 2023-12-04 PROCEDURE — 99207 PR VEINSOLUTIONS NO CHARGE VISIT: CPT | Performed by: SURGERY

## 2023-12-04 NOTE — PROGRESS NOTES
[facility]in [facility] [place] office note    Arielle Domínguez returns in follow-up of her first session of cosmetic sclerotherapy on 10/3/2023.  Unfortunate, she arrived  late for her appointment.    I examined her legs and note small areas of trapped blood on the mid anterolateral left thigh and on the mid anteromedial left leg.    There are a few scattered telangiectasias over the left greater than right anterolateral thighs and a few in the right popliteal fossa which she would like to have treated in the future.    Plan  Return in the near future for a second session of cosmetic sclerotherapy    OKSANA Huffman MD, FACS    Dictated using Dragon voice recognition software which may result in transcription errors

## 2023-12-04 NOTE — LETTER
12/4/2023         RE: Arielle Domínguez  [address] [place] [address]        Dear Colleague,    Thank you for referring your patient, Arielle Domínguez, to the [facility] VEIN CLIN[facility] [place]. Please see a copy of my visit note below.     [facility] Vein [facility] office note    Arielle Domínguez returns in follow-up of her first session of cosmetic sclerotherapy on 10/3/2023.  Unfortunate, she arrived  late for her appointment.    I examined her legs and note small areas of trapped blood on the mid anterolateral left thigh and on the mid anteromedial left leg.    There are a few scattered telangiectasias over the left greater than right anterolateral thighs and a few in the right popliteal fossa which she would like to have treated in the future.    Plan  Return in the near future for a second session of cosmetic sclerotherapy    OKSANA Huffman MD, FACS    Dictated using Dragon voice recognition software which may result in transcription errors      Again, thank you for allowing me to participate in the care of your patient.        Sincerely,        Olaf Huffman MD

## 2023-12-12 ENCOUNTER — OFFICE VISIT (OUTPATIENT)
Dept: VASCULAR SURGERY | Facility: CLINIC | Age: 53
End: 2023-12-12
Payer: COMMERCIAL

## 2023-12-12 DIAGNOSIS — I78.1 SPIDER TELANGIECTASIS OF SKIN: Primary | ICD-10-CM

## 2023-12-12 PROCEDURE — 36468 NJX SCLRSNT SPIDER VEINS: CPT | Performed by: SURGERY

## 2023-12-12 PROCEDURE — S9999 SALES TAX: HCPCS | Performed by: SURGERY

## 2023-12-12 NOTE — PROGRESS NOTES
Vein Clinic Sclerotherapy Note     Indications:  Residual, bilateral lower extremity spider telangiectasias of cosmetic concern; status post 1 session cosmetic sclerotherapy     Procedure:  Bilateral lower extremity cosmetic sclerotherapy     Procedure description  Details of procedure including risks of allergic reaction, deep vein thrombosis, ulceration, superficial thrombophlebitis and hyperpigmentation were discussed.  The patient voiced understanding and wished to proceed.  Informed consent was obtained.    I donned the Syris headlight and injected a few, fine, residual telangiectasias over the lateral left thigh, to a lesser extent the lateral right thigh and some very small telangiectasias on the distal medial thighs bilaterally.    The patient then turned prone and I injected telangiectasias in the popliteal fossas as well as reticular veins which she pointed out to me.  We used a total of approximately 4.5 mL of 0.5% polidocanol foam.  I had her perform ankle pumps.    I did not see any significant trapped blood.    We cleaned her legs, had her don compression and then had her walk for 10 minutes.  She will return in 6 weeks in follow-up.  Sclerotherapy    Date/Time: 12/12/2023 9:54 AM    Performed by: Olaf Huffman MD  Authorized by: Olaf Huffman MD    Time out: Immediately prior to the procedure a time out was called    Type:  Cosmetic  Session:  Limited  Procedure side:  Bilateral  Solution/Amount:  .5 POLIDOCANOL  Syringes:  2  Patient tolerance:  Patient tolerated the procedure well with no immediate complications  Wrap/Hose:  Shaun Huffman MD    Dictated using Dragon voice recognition software which may result in transcription errors

## 2023-12-12 NOTE — LETTER
12/12/2023         RE: Arielle Domínguez  [address] [place] [address]        Dear Colleague,    Thank you for referring your patient, Arielle Domínguez, to the [facility] VEIN CLINIC [place]. Please see a copy of my visit note below.        Vein Clinic Sclerotherapy Note     Indications:  Residual, bilateral lower extremity spider telangiectasias of cosmetic concern; status post 1 session cosmetic sclerotherapy     Procedure:  Bilateral lower extremity cosmetic sclerotherapy     Procedure description  Details of procedure including risks of allergic reaction, deep vein thrombosis, ulceration, superficial thrombophlebitis and hyperpigmentation were discussed.  The patient voiced understanding and wished to proceed.  Informed consent was obtained.    I donned the Syris headlight and injected a few, fine, residual telangiectasias over the lateral left thigh, to a lesser extent the lateral right thigh and some very small telangiectasias on the distal medial thighs bilaterally.    The patient then turned prone and I injected telangiectasias in the popliteal fossas as well as reticular veins which she pointed out to me.  We used a total of approximately 4.5 mL of 0.5% polidocanol foam.  I had her perform ankle pumps.    I did not see any significant trapped blood.    We cleaned her legs, had her don compression and then had her walk for 10 minutes.  She will return in 6 weeks in follow-up.  Sclerotherapy    Date/Time: 12/12/2023 9:54 AM    Performed by: Olaf Huffman MD  Authorized by: Olaf Huffman MD    Time out: Immediately prior to the procedure a time out was called    Type:  Cosmetic  Session:  Limited  Procedure side:  Bilateral  Solution/Amount:  .5 POLIDOCANOL  Syringes:  2  Patient tolerance:  Patient tolerated the procedure well with no immediate complications  Wrap/Hose:  Shaun Huffman MD    Dictated using Dragon voice recognition software which may  result in transcription errors      Again, thank you for allowing me to participate in the care of your patient.        Sincerely,        Olaf Huffman MD

## 2024-02-27 ENCOUNTER — OFFICE VISIT (OUTPATIENT)
Dept: VASCULAR SURGERY | Facility: CLINIC | Age: 54
End: 2024-02-27
Payer: COMMERCIAL

## 2024-02-27 DIAGNOSIS — I78.1 SPIDER TELANGIECTASIS OF SKIN: Primary | ICD-10-CM

## 2024-02-27 PROCEDURE — 99207 PR VEINSOLUTIONS NO CHARGE VISIT: CPT | Performed by: SURGERY

## 2024-02-27 NOTE — LETTER
2/27/2024         RE: Arielle Domínguez  [address] [place] [address]        Dear Colleague,    Thank you for referring your patient, Arielle Domínguez, to the [facility] VEIN CL[facility] [place]. Please see a copy of my visit note below.    [facility] Vein [facility] office note    Arielle Domínguez returns following 2 sessions of cosmetic sclerotherapy.  Overall she is very happy with the improvement.    Exam  A few scattered, hardly visible spiders over the lateral left leg, the area of most concern when she began her treatment.  Significantly improved.    A few, light reticular veins in the left popliteal fossa.    Assessment  Excellent result following 2 sessions of sclerotherapy.  The patient is very happy.    Plan  Return on an as-needed basis    OKSANA Huffman MD, FACS    Dictated using Dragon voice recognition software which may result in transcription errors        Again, thank you for allowing me to participate in the care of your patient.        Sincerely,        Olaf Huffman MD

## 2024-02-27 NOTE — PROGRESS NOTES
[facility] Vein Clinic [place] office note    Arielle Domínguez returns following 2 sessions of cosmetic sclerotherapy.  Overall she is very happy with the improvement.    Exam  A few scattered, hardly visible spiders over the lateral left leg, the area of most concern when she began her treatment.  Significantly improved.    A few, light reticular veins in the left popliteal fossa.    Assessment  Excellent result following 2 sessions of sclerotherapy.  The patient is very happy.    Plan  Return on an as-needed basis    C Swathi Huffman MD, FACS    Dictated using Dragon voice recognition software which may result in transcription errors